# Patient Record
Sex: MALE | URBAN - NONMETROPOLITAN AREA
[De-identification: names, ages, dates, MRNs, and addresses within clinical notes are randomized per-mention and may not be internally consistent; named-entity substitution may affect disease eponyms.]

---

## 2022-10-21 ENCOUNTER — TELEPHONE (OUTPATIENT)
Dept: INFECTIOUS DISEASES | Age: 22
End: 2022-10-21

## 2022-10-21 NOTE — TELEPHONE ENCOUNTER
Seferino Stern (patients mother) called to schedule appointment for [facility] per referral.  Seferino Stern requested appointment for her son for Monday, October 24. Unable to give this appointment time. Gave appointment on Monday, October 31 @ 08:15. Asked her to call if he was unable to make appointment.

## 2022-10-27 ENCOUNTER — TELEPHONE (OUTPATIENT)
Dept: INFECTIOUS DISEASES | Age: 22
End: 2022-10-27

## 2022-10-31 ENCOUNTER — TELEPHONE (OUTPATIENT)
Dept: INFECTIOUS DISEASES | Age: 22
End: 2022-10-31

## 2022-10-31 NOTE — TELEPHONE ENCOUNTER
Patient called on Friday, October 28 left a voice message that he was cancelling his appointment due to he didn't think he needed anymore. Patient was informed that if anything changed he could call the clinic back to reschedule. Direct line phone number was given. Max Brizuela from [facility], [id number], Specialist  for [email or web address] was called and left voice message that patient cancelled his appointment, that he didn't think he needed it.